# Patient Record
Sex: FEMALE | Race: WHITE
[De-identification: names, ages, dates, MRNs, and addresses within clinical notes are randomized per-mention and may not be internally consistent; named-entity substitution may affect disease eponyms.]

---

## 2019-07-04 ENCOUNTER — HOSPITAL ENCOUNTER (INPATIENT)
Dept: HOSPITAL 47 - EC | Age: 79
LOS: 2 days | Discharge: HOME | DRG: 418 | End: 2019-07-06
Payer: MEDICARE

## 2019-07-04 VITALS — BODY MASS INDEX: 23.3 KG/M2

## 2019-07-04 DIAGNOSIS — K85.10: Primary | ICD-10-CM

## 2019-07-04 DIAGNOSIS — R18.8: ICD-10-CM

## 2019-07-04 DIAGNOSIS — Z88.0: ICD-10-CM

## 2019-07-04 DIAGNOSIS — K80.43: ICD-10-CM

## 2019-07-04 DIAGNOSIS — K80.01: ICD-10-CM

## 2019-07-04 DIAGNOSIS — Z91.011: ICD-10-CM

## 2019-07-04 DIAGNOSIS — J45.909: ICD-10-CM

## 2019-07-04 DIAGNOSIS — K75.9: ICD-10-CM

## 2019-07-04 DIAGNOSIS — Z79.51: ICD-10-CM

## 2019-07-04 DIAGNOSIS — Z88.8: ICD-10-CM

## 2019-07-04 DIAGNOSIS — Z79.899: ICD-10-CM

## 2019-07-04 LAB
ALBUMIN SERPL-MCNC: 3.6 G/DL (ref 3.5–5)
ALP SERPL-CCNC: 74 U/L (ref 38–126)
ALT SERPL-CCNC: 372 U/L (ref 9–52)
ANION GAP SERPL CALC-SCNC: 6 MMOL/L
AST SERPL-CCNC: 423 U/L (ref 14–36)
BASOPHILS # BLD AUTO: 0 K/UL (ref 0–0.2)
BASOPHILS NFR BLD AUTO: 1 %
BUN SERPL-SCNC: 12 MG/DL (ref 7–17)
CALCIUM SPEC-MCNC: 8.6 MG/DL (ref 8.4–10.2)
CHLORIDE SERPL-SCNC: 108 MMOL/L (ref 98–107)
CO2 SERPL-SCNC: 27 MMOL/L (ref 22–30)
EOSINOPHIL # BLD AUTO: 0.1 K/UL (ref 0–0.7)
EOSINOPHIL NFR BLD AUTO: 1 %
ERYTHROCYTE [DISTWIDTH] IN BLOOD BY AUTOMATED COUNT: 4.42 M/UL (ref 3.8–5.4)
ERYTHROCYTE [DISTWIDTH] IN BLOOD: 13.7 % (ref 11.5–15.5)
GLUCOSE SERPL-MCNC: 88 MG/DL (ref 74–99)
HCT VFR BLD AUTO: 40.8 % (ref 34–46)
HGB BLD-MCNC: 13 GM/DL (ref 11.4–16)
INR PPP: 1 (ref ?–1.2)
LIPASE SERPL-CCNC: 1447 U/L (ref 23–300)
LYMPHOCYTES # SPEC AUTO: 0.8 K/UL (ref 1–4.8)
LYMPHOCYTES NFR SPEC AUTO: 12 %
MAGNESIUM SPEC-SCNC: 2.2 MG/DL (ref 1.6–2.3)
MCH RBC QN AUTO: 29.5 PG (ref 25–35)
MCHC RBC AUTO-ENTMCNC: 31.9 G/DL (ref 31–37)
MCV RBC AUTO: 92.4 FL (ref 80–100)
MONOCYTES # BLD AUTO: 0.5 K/UL (ref 0–1)
MONOCYTES NFR BLD AUTO: 7 %
NEUTROPHILS # BLD AUTO: 5.3 K/UL (ref 1.3–7.7)
NEUTROPHILS NFR BLD AUTO: 78 %
PLATELET # BLD AUTO: 223 K/UL (ref 150–450)
POTASSIUM SERPL-SCNC: 4 MMOL/L (ref 3.5–5.1)
PROT SERPL-MCNC: 6.1 G/DL (ref 6.3–8.2)
PT BLD: 10.5 SEC (ref 9–12)
SODIUM SERPL-SCNC: 141 MMOL/L (ref 137–145)
WBC # BLD AUTO: 6.9 K/UL (ref 3.8–10.6)

## 2019-07-04 PROCEDURE — 82150 ASSAY OF AMYLASE: CPT

## 2019-07-04 PROCEDURE — 85610 PROTHROMBIN TIME: CPT

## 2019-07-04 PROCEDURE — 99285 EMERGENCY DEPT VISIT HI MDM: CPT

## 2019-07-04 PROCEDURE — 94640 AIRWAY INHALATION TREATMENT: CPT

## 2019-07-04 PROCEDURE — 88304 TISSUE EXAM BY PATHOLOGIST: CPT

## 2019-07-04 PROCEDURE — 85025 COMPLETE CBC W/AUTO DIFF WBC: CPT

## 2019-07-04 PROCEDURE — 83690 ASSAY OF LIPASE: CPT

## 2019-07-04 PROCEDURE — 83735 ASSAY OF MAGNESIUM: CPT

## 2019-07-04 PROCEDURE — 80053 COMPREHEN METABOLIC PANEL: CPT

## 2019-07-04 RX ADMIN — CEFAZOLIN SCH MLS/HR: 330 INJECTION, POWDER, FOR SOLUTION INTRAMUSCULAR; INTRAVENOUS at 22:19

## 2019-07-04 NOTE — ED
General Adult HPI





- General


Stated complaint: Abd Pain


Time Seen by Provider: 07/04/19 21:31


Source: patient, family, EMS, RN notes reviewed, old records reviewed


Mode of arrival: EMS


Limitations: no limitations





- History of Present Illness


Initial comments: 





78-year-old female presenting as transfer from outside hospital with 

cholelithiasis and acute cholecystitis.  Patient reports 2 days of epigastric 

and right upper quadrant abdominal pain.  She had several episodes of vomiting. 

She does complain of subjective fever or chills.  She is otherwise healthy with 

past medical history of asthma for which she follows with pulmonology.  Denies 

chest pain or dyspnea.  No lower abdominal pain.  Patient was given antibiotics 

prior to transfer.  Transfer for surgical evaluation.





- Related Data


                                Home Medications











 Medication  Instructions  Recorded  Confirmed


 


Budesonide/Formoterol Fumarate 2 puff INHALATION RT-BID 07/04/19 07/04/19





[Symbicort 160-4.5 Mcg Inhaler]   


 


Calcium Carbonate [Calcium] 1,200 mg PO DAILY 07/04/19 07/04/19


 


Cetirizine HCl [Zyrtec] 10 mg PO HS 07/04/19 07/04/19


 


Montelukast [Singulair] 10 mg PO HS 07/04/19 07/04/19











                                    Allergies











Allergy/AdvReac Type Severity Reaction Status Date / Time


 


aspirin Allergy  PERALLERGIS Verified 07/04/19 21:53





   T  


 


Penicillins Allergy  Rash/Hives Verified 07/04/19 21:53


 


Milk Containing Products AdvReac  Nausea & Verified 07/04/19 21:53





   Vomiting  














Review of Systems


ROS Statement: 


Those systems with pertinent positive or pertinent negative responses have been 

documented in the HPI.





ROS Other: All systems not noted in ROS Statement are negative.





Past Medical History


Past Medical History: Asthma


Past Surgical History: No Surgical Hx Reported


Smoking Status: Never smoker


Past Alcohol Use History: None Reported


Past Drug Use History: None Reported





General Exam


Limitations: no limitations


General appearance: alert, in no apparent distress


Head exam: Present: atraumatic, normocephalic


Eye exam: Present: normal appearance, PERRL


ENT exam: Present: normal exam


Neck exam: Present: normal inspection.  Absent: tenderness, meningismus


Respiratory exam: Present: normal lung sounds bilaterally.  Absent: respiratory 

distress, wheezes


Cardiovascular Exam: Present: regular rate, normal rhythm


GI/Abdominal exam: Present: soft, tenderness (Right upper quadrant and 

epigastric tenderness to palpation).  Absent: distended, guarding, rebound


Extremities exam: Present: normal inspection, normal capillary refill.  Absent: 

pedal edema, calf tenderness


Neurological exam: Present: alert, oriented X3, CN II-XII intact.  Absent: motor

 sensory deficit


Psychiatric exam: Present: normal affect, normal mood


Skin exam: Present: warm, dry, intact.  Absent: cyanosis, diaphoretic





Course


                                   Vital Signs











  07/04/19





  21:31


 


Temperature 98.3 F


 


Pulse Rate 63


 


Respiratory 18





Rate 


 


Blood Pressure 154/78


 


O2 Sat by Pulse 98





Oximetry 














Medical Decision Making





- Medical Decision Making





78-year-old female transferred from outside facility with acute cholecystitis 

and cholelithiasis.  Laboratory studies reviewed, patient blood cell count 8.9, 

normal electrolytes, normal creatinine.  She had a transaminitis with an AST 

464, .  She'll total bili 2.6.  Lipase 625.  Patient is well-appearing 

with stable vitals.  She is nontoxic.  CT interpretation is reviewed, 

cholelithiasis with gallbladder wall thickening, pericholecystic fluid.  Minimal

 dilatation of the intrahepatic docs.  She was given clindamycin and Flagyl 

prior to transfer.  She will be continued on antibiotics and admitted to general

 surgery.  Case is discussed with Dr. Caceres, she will accept admission.








Laboratory studies will be repeated in the morning.





Disposition


Clinical Impression: 


 Acute cholecystitis





Disposition: ADMITTED AS IP TO THIS [facility]


Condition: Stable


Is patient prescribed a controlled substance at d/c from ED?: No


Referrals: 


None,Stated [Primary Care Provider] - 1-2 days


Decision to Admit Reason: Admit from EC


Decision Date: 07/04/19


Decision Time: 21:58

## 2019-07-05 VITALS — RESPIRATION RATE: 16 BRPM

## 2019-07-05 LAB
ALBUMIN SERPL-MCNC: 3.5 G/DL (ref 3.5–5)
ALP SERPL-CCNC: 66 U/L (ref 38–126)
ALT SERPL-CCNC: 299 U/L (ref 9–52)
AMYLASE SERPL-CCNC: 155 U/L (ref 30–110)
ANION GAP SERPL CALC-SCNC: 8 MMOL/L
AST SERPL-CCNC: 235 U/L (ref 14–36)
BUN SERPL-SCNC: 12 MG/DL (ref 7–17)
CALCIUM SPEC-MCNC: 8.2 MG/DL (ref 8.4–10.2)
CHLORIDE SERPL-SCNC: 110 MMOL/L (ref 98–107)
CO2 SERPL-SCNC: 24 MMOL/L (ref 22–30)
GLUCOSE SERPL-MCNC: 79 MG/DL (ref 74–99)
LIPASE SERPL-CCNC: 429 U/L (ref 23–300)
POTASSIUM SERPL-SCNC: 3.6 MMOL/L (ref 3.5–5.1)
PROT SERPL-MCNC: 5.8 G/DL (ref 6.3–8.2)
SODIUM SERPL-SCNC: 142 MMOL/L (ref 137–145)

## 2019-07-05 PROCEDURE — 0FT44ZZ RESECTION OF GALLBLADDER, PERCUTANEOUS ENDOSCOPIC APPROACH: ICD-10-PCS

## 2019-07-05 PROCEDURE — 8E0W4CZ ROBOTIC ASSISTED PROCEDURE OF TRUNK REGION, PERCUTANEOUS ENDOSCOPIC APPROACH: ICD-10-PCS

## 2019-07-05 PROCEDURE — 4A1BXSH MONITORING OF GASTROINTESTINAL VASCULAR PERFUSION USING INDOCYANINE GREEN DYE, EXTERNAL APPROACH: ICD-10-PCS

## 2019-07-05 RX ADMIN — HEPARIN SODIUM SCH UNIT: 5000 INJECTION, SOLUTION INTRAVENOUS; SUBCUTANEOUS at 21:18

## 2019-07-05 RX ADMIN — METRONIDAZOLE SCH MLS/HR: 500 INJECTION, SOLUTION INTRAVENOUS at 21:19

## 2019-07-05 RX ADMIN — METRONIDAZOLE SCH MLS/HR: 500 INJECTION, SOLUTION INTRAVENOUS at 04:56

## 2019-07-05 RX ADMIN — CEFAZOLIN SCH MLS: 330 INJECTION, POWDER, FOR SOLUTION INTRAMUSCULAR; INTRAVENOUS at 09:09

## 2019-07-05 RX ADMIN — CEFAZOLIN SCH MLS/HR: 330 INJECTION, POWDER, FOR SOLUTION INTRAMUSCULAR; INTRAVENOUS at 23:16

## 2019-07-05 RX ADMIN — BUDESONIDE AND FORMOTEROL FUMARATE DIHYDRATE SCH PUFF: 160; 4.5 AEROSOL RESPIRATORY (INHALATION) at 19:41

## 2019-07-05 RX ADMIN — CEFAZOLIN SCH MLS: 330 INJECTION, POWDER, FOR SOLUTION INTRAMUSCULAR; INTRAVENOUS at 07:27

## 2019-07-05 RX ADMIN — CEFAZOLIN SCH GM: 10 INJECTION, POWDER, FOR SOLUTION INTRAVENOUS at 17:52

## 2019-07-05 RX ADMIN — METRONIDAZOLE SCH MLS/HR: 500 INJECTION, SOLUTION INTRAVENOUS at 14:18

## 2019-07-05 NOTE — P.GSHP
History of Present Illness


H&P Date: 07/05/19











CHIEF COMPLAINT: Cholecystitis 





HISTORY OF PRESENT ILLNESS: The patient is a 78-year-old female who presents 

with history of epigastric including right upper quadrant abdominal pain x 3 

days. She reports eating pizza prior to her pain. She reports not seeing a 

medical provider in the last 3 years due to lost of provider serving her 

community. She denies any previous abdominal surgeries. She reports general good

health. She denies any chest pain, shortness of breath. She presents as a 

transfer from [facility] for pancreatitis, elevated liver enzymes and

acute cholecystitis with pericholecystic fluid. She is admitted for pancreatitis

with gallstones. 





PAST MEDICAL HISTORY: 


Please see list





PAST SURGICAL HISTORY: 


Please see list





MEDICATIONS: 


Please see list





ALLERGIES: Denies. 





SOCIAL HISTORY: No illicit drug use or recent tobacco use





FAMILY HISTORY: Noncontributory. 





REVIEW OF ORGAN SYSTEMS: 


CONSTITUTIONAL: No reports of fevers or chills. 


EYES: Denies any trouble with vision. No glasses.


HEENT:  No difficulties with hearing. No nosebleeds.  No difficulty swallowing. 


RESPIRATORY:  Denies pneumonia. Denies any troubles with breathing or dyspnea on

exertion. 


CARDIOVASCULAR:  Denies any chest pain, palpitations, or recent heart attacks. 


GASTROINTESTINAL: Has fatty food intolerance.  


GENITOURINARY:  Denies any blood in urine or increased urinary frequency. 


NEUROLOGICAL:  Denies any numbness or tingling along the distal extremities. No 

seizure disorders or headaches.


MUSCULOSKELETAL: Has occasional back pain, stiffness or joint arthritis. 


SKIN: No rash. No skin cancer.


PSYCHIATRIC:  Denies current depression or suicidal thoughts.


ENDOCRINE:  Denies current thyroid disorders. Denies any blood sugar glucose 

intolerance.


HEME/LYMPHATIC: Denies any lumps and bumps around the neck. No recent deep 

venous thrombosis.  


ALLERGY/IMMUNOLOGY:  No immunoglobulin therapy. No immune deficiencies.


BREAST: Denies current breast lumps, pain or nipple discharge.





PHYSICAL EXAM:


VITALS: Reviewed


CONSTITUTIONAL:  Well developed and in no acute distress. 


EYES:  Conjuctivae without sclera icterus. Pupils are equally round and reactive

to light. Extraocular movements grossly intact. 


HEAD, EARS, NOSE, THROAT: Moist buccal mucosa. Head is atraumatic, normocepha

lic. Hears conversational speech. No nasal drainage. Good dentition.


NECK:  Supple. No JV distention. No thyroidomegaly.


RESPIRATORY:  Non-labored respirations and equal bilateral excursions. No gross 

wheezes. 


CARDIOVASCULAR:  Regular rate and rhythm.  Extremities without moderate edema. 

Palpable 2+ radial pulses.


ABDOMEN:  No hepatomegaly.  Non-distended. Tender at epigastrium and right upper

quadrant.


LYMPH: No neck lymphadenopathy. 


MUSCULOSKELETAL: Gait within normal limits. Range of motion bilateral upper 

extremities within normal limits.  Nail and fingers with good capillary refill.


SKIN:  Warm and well perfused with good skin turgor.


NEUROLOGIC: Cranial nerves I through XII grossly intact. Sensation upper and 

extremities intact. No focal or lateralizing signs. 


PSYCH:  Appropriate affect.  Alert and oriented to person, place and time. 

Displays appropriate insight.





CLINCAL LABS: Reviewed LFTs moderately elevated, lipase elevated.








ASSESSMENT: 


1.  Epigastric and right upper quadrant abdominal pain


2.  Acute pancreatitis


3.  Symptomatic gallstones with acute cholecystits


4.  Elevated liver enzymes





PLAN: 


1.  Will need a robotic cholecystectomy possible open.  Benefits and risks were 

described. 


2.  Heparin for DVT prophylaxis 5000 units.


3.  Antibiotic prophylaxis.


4.  12-lead EKG for pre-op


5.  Inpatient hospitalization more than 2 nights for severity of pancreatitis 

acute with acute cholecystitis and hepatitis without jaundice.








Past Medical History


Past Medical History: Asthma


History of Any Multi-Drug Resistant Organisms: None Reported


Past Surgical History: No Surgical Hx Reported


Past Anesthesia/Blood Transfusion Reactions: No Reported Reaction


Past Psychological History: No Psychological Hx Reported


Smoking Status: Never smoker


Past Alcohol Use History: None Reported


Past Drug Use History: None Reported





Medications and Allergies


                                Home Medications











 Medication  Instructions  Recorded  Confirmed  Type


 


Budesonide/Formoterol Fumarate 2 puff INHALATION RT-BID 07/04/19 07/04/19 

History





[Symbicort 160-4.5 Mcg Inhaler]    


 


Calcium Carbonate [Calcium] 1,200 mg PO DAILY 07/04/19 07/04/19 History


 


Cetirizine HCl [Zyrtec] 10 mg PO HS 07/04/19 07/04/19 History


 


Montelukast [Singulair] 10 mg PO HS 07/04/19 07/04/19 History








                                    Allergies











Allergy/AdvReac Type Severity Reaction Status Date / Time


 


aspirin Allergy  PERALLERGIS Verified 07/04/19 21:53





   T  


 


Penicillins Allergy  Rash/Hives Verified 07/04/19 21:53


 


Milk Containing Products AdvReac  Nausea & Verified 07/04/19 21:53





   Vomiting  














Surgical - Exam


                                   Vital Signs











Temp Pulse Resp BP Pulse Ox


 


 98.3 F   63   18   154/78   98 


 


 07/04/19 21:31  07/04/19 21:31  07/04/19 21:31  07/04/19 21:31  07/04/19 21:31














Results





- Labs





                                 07/04/19 22:15





                                 07/04/19 22:15


                  Abnormal Lab Results - Last 24 Hours (Table)











  07/04/19 07/04/19 Range/Units





  22:15 22:15 


 


Lymphocytes #  0.8 L   (1.0-4.8)  k/uL


 


Chloride   108 H  ()  mmol/L


 


AST   423 H  (14-36)  U/L


 


ALT   372 H  (9-52)  U/L


 


Total Protein   6.1 L  (6.3-8.2)  g/dL


 


Lipase   1447 H  ()  U/L








                                 Diabetes panel











  07/04/19 Range/Units





  22:15 


 


Sodium  141  (137-145)  mmol/L


 


Potassium  4.0  (3.5-5.1)  mmol/L


 


Chloride  108 H  ()  mmol/L


 


Carbon Dioxide  27  (22-30)  mmol/L


 


BUN  12  (7-17)  mg/dL


 


Creatinine  0.79  (0.52-1.04)  mg/dL


 


Glucose  88  (74-99)  mg/dL


 


Calcium  8.6  (8.4-10.2)  mg/dL


 


AST  423 H  (14-36)  U/L


 


ALT  372 H  (9-52)  U/L


 


Alkaline Phosphatase  74  ()  U/L


 


Total Protein  6.1 L  (6.3-8.2)  g/dL


 


Albumin  3.6  (3.5-5.0)  g/dL








                                  Calcium panel











  07/04/19 Range/Units





  22:15 


 


Calcium  8.6  (8.4-10.2)  mg/dL


 


Albumin  3.6  (3.5-5.0)  g/dL








                                 Pituitary panel











  07/04/19 Range/Units





  22:15 


 


Sodium  141  (137-145)  mmol/L


 


Potassium  4.0  (3.5-5.1)  mmol/L


 


Chloride  108 H  ()  mmol/L


 


Carbon Dioxide  27  (22-30)  mmol/L


 


BUN  12  (7-17)  mg/dL


 


Creatinine  0.79  (0.52-1.04)  mg/dL


 


Glucose  88  (74-99)  mg/dL


 


Calcium  8.6  (8.4-10.2)  mg/dL








                                  Adrenal panel











  07/04/19 Range/Units





  22:15 


 


Sodium  141  (137-145)  mmol/L


 


Potassium  4.0  (3.5-5.1)  mmol/L


 


Chloride  108 H  ()  mmol/L


 


Carbon Dioxide  27  (22-30)  mmol/L


 


BUN  12  (7-17)  mg/dL


 


Creatinine  0.79  (0.52-1.04)  mg/dL


 


Glucose  88  (74-99)  mg/dL


 


Calcium  8.6  (8.4-10.2)  mg/dL


 


Total Bilirubin  1.0  (0.2-1.3)  mg/dL


 


AST  423 H  (14-36)  U/L


 


ALT  372 H  (9-52)  U/L


 


Alkaline Phosphatase  74  ()  U/L


 


Total Protein  6.1 L  (6.3-8.2)  g/dL


 


Albumin  3.6  (3.5-5.0)  g/dL














Assessment and Plan


(1) Acute pancreatitis


Current Visit: Yes   Status: Acute   Code(s): K85.90 - ACUTE PANCREATITIS 

WITHOUT NECROSIS OR INFECTION, UNSP   SNOMED Code(s): 940034353


   





(2) Transaminitis


Current Visit: Yes   Status: Acute   Code(s): R74.0 - NONSPEC ELEV OF LEVELS OF 

TRANSAMNS & LACTIC ACID DEHYDRGNSE   SNOMED Code(s): 216441030


   





(3) Acute cholecystitis


Current Visit: Yes   Status: Acute   Code(s): K81.0 - ACUTE CHOLECYSTITIS   

SNOMED Code(s): 81259680

## 2019-07-05 NOTE — P.OP
Date of Procedure: 07/05/19


Description of Procedure: 











SURGEON:  TINO HERNANDEZ MD





PREOPERATIVE DIAGNOSES:  


1.  Acute pancreatitis due to gallstones


2.  Symptomatic gallstones with acute cholecystitis due to cystic duct 

obstruction


3.  Right upper quadrant abdominal pain with epigastric abdominal pain


4.  Elevated liver enzymes, transaminitis


5.  Asthma


6.  Seasonal ALLERGIES





POSTOPERATIVE DIAGNOSES:  


1.  Acute pancreatitis due to gallstones


2.  Symptomatic gallstones with acute cholecystitis due to cystic duct 

obstruction


3.  Right upper quadrant abdominal pain with epigastric abdominal pain


4.  Elevated liver enzymes, transaminitis


5.  Asthma


6.  Seasonal ALLERGIES





OPERATION:       


Robotic-assisted da Lyubov Xi laparoscopic cholecystectomy, multiport with 

FIREFLY


 


ESTIMATED BLOOD LOSS:  5 mL.


SPECIMENS REMOVED:  Gallbladder.


COMPLICATIONS:  None.





OPERATIVE FINDINGS:  


1.  Moderate edema gallbladder wall with pericholecystic fluid consistent with 

acute cholecystitis


2.  Mild intra-abdominal ascites


3.  Minimal fatty liver disease without cirrhosis





INDICATIONS: The patient is a 78-year-old female who presents with gallstone-

induced pancreatitis with acute cholecystitis and elevated transaminases.  

Urgent surgical intervention with cholecystectomy was described at length 

including injury to the biliary


tree, bleeding, infection, need for further surgery. Informed consent was 

obtained.  Robotic 


assisted laparoscopic approach was described. Benefits and risks of the 

procedure including but not limited to bleeding, 


infection, injury to the biliary tree was described. Informed consent was 

obtained.  





DESCRIPTION OF PROCEDURE: Patient was brought to the operating room, 


placed in supine position. After general induction, the abdomen had 


been prepped and draped in standard sterile fashion. The robotic da Lyubov 


XI system was primed.  





After a timeout protocol was performed, the patient had been prepped 


and draped in standard sterile fashion.





The patient was injected with indocyanine green.





A 5 mm 0 degrees laparoscopic trocar entry was performed along the left upper 

quadrant.  The abdomen insufflated to 15 mmHg pressure which was tolerated well.

Diagnostic laparoscopy demonstrated no injury to bowel viscera or mesentery.  

The liver surface was unremarkable. Next, two 8 mm robotic ports were placed 

along the right upper abdomen. The camera 8-mm port was maintained along the 

epigastrium.  Another 8 mm port was placed along the left upper abdominal wall 

after exchanging the 5 mm port. Please note that the ports were placed at least 

10 to 15 cm away from the target anatomy of the gallbladder. 





The robot was docked along the left lateral abdomen. 


The patient was repositioned in reverse Trendelenburg position. 





Using a grasper for arm 3, a grasper for arm 4, including hook cautery for arm 

1, the 


robotic system was docked and primed as described.  


Instruments were interchanged by the assistant including hook cautery, Bovie 

cautery and clip appliers.





I had sat at the console. 





The gallbladder fundus was retracted over the dome of the liver.





Moderate edema of the gallbladder wall was identified.  The cystic duct was 

dilated.  The common bile duct was visualized using contrast.





Initial attention was brought to the infundibulum which was gently


retracted in the inferior lateral approach. Using a grasper, the cystic


duct including the cystic artery was carefully skeletonized.


FIREFLY was used to identify the cystic artery and cystic structures.


Large PLASTIC clips were used throughout the entire case.


Using a clip applier 2 clips were placed proximally, and divided proximally 

using vessel sealer. Again care was taken to


avoid any injury to the biliary tree as the common bile duct was clearly


visualized during this portion of dissection. Next, the cystic artery


was similarly clipped and cauterized.





Electro-Bovie cautery was used to remove the gallbladder from the


hepatic fossa. Hemostasis was checked and found to be adequate. 





The robot was undocked.





I re-scrubbed into the case.





Using a 10 mm Endo Catch bag via the left upper quadrant incision, the 


specimen was removed from the abdominal cavity. 





For the size of the gallbladder, the incision of the left upper quadrant was 

widened.  Fascia was closed using 0 Vicryl and Obed Michel.





All pneumoperitoneum instruments were evacuated from the abdominal 


cavity. The incisions were reapproximated using 4-0 Monocryl in an interrupted 


subcuticular fashion.  





Please note along the trocar sites, local anesthetic was placed as a field block




prior to insertion of all instruments.  





Liquid glue was applied to the skin.





At the end of the procedure needle, sponge, and instrument count had 


been verified correct by the surgical technician. The patient was 


transferred to postanesthesia care unit in stable condition.





I personally contacted the patient's daughter over the phone regarding 

intraoperative findings and overall condition is good.  Her family was pleased 

with the level of care.

## 2019-07-06 VITALS — DIASTOLIC BLOOD PRESSURE: 54 MMHG | SYSTOLIC BLOOD PRESSURE: 100 MMHG | TEMPERATURE: 98 F | HEART RATE: 68 BPM

## 2019-07-06 LAB
ALBUMIN SERPL-MCNC: 3 G/DL (ref 3.5–5)
ALP SERPL-CCNC: 53 U/L (ref 38–126)
ALT SERPL-CCNC: 150 U/L (ref 9–52)
ANION GAP SERPL CALC-SCNC: 10 MMOL/L
AST SERPL-CCNC: 94 U/L (ref 14–36)
BUN SERPL-SCNC: 11 MG/DL (ref 7–17)
CALCIUM SPEC-MCNC: 7.3 MG/DL (ref 8.4–10.2)
CHLORIDE SERPL-SCNC: 108 MMOL/L (ref 98–107)
CO2 SERPL-SCNC: 22 MMOL/L (ref 22–30)
GLUCOSE SERPL-MCNC: 75 MG/DL (ref 74–99)
LIPASE SERPL-CCNC: 68 U/L (ref 23–300)
POTASSIUM SERPL-SCNC: 3.9 MMOL/L (ref 3.5–5.1)
PROT SERPL-MCNC: 5.2 G/DL (ref 6.3–8.2)
SODIUM SERPL-SCNC: 140 MMOL/L (ref 137–145)

## 2019-07-06 RX ADMIN — CEFAZOLIN SCH GM: 10 INJECTION, POWDER, FOR SOLUTION INTRAVENOUS at 10:17

## 2019-07-06 RX ADMIN — HEPARIN SODIUM SCH UNIT: 5000 INJECTION, SOLUTION INTRAVENOUS; SUBCUTANEOUS at 08:46

## 2019-07-06 RX ADMIN — BUDESONIDE AND FORMOTEROL FUMARATE DIHYDRATE SCH PUFF: 160; 4.5 AEROSOL RESPIRATORY (INHALATION) at 08:44

## 2019-07-06 RX ADMIN — CEFAZOLIN SCH GM: 10 INJECTION, POWDER, FOR SOLUTION INTRAVENOUS at 01:40

## 2019-07-06 RX ADMIN — CEFAZOLIN SCH MLS/HR: 330 INJECTION, POWDER, FOR SOLUTION INTRAMUSCULAR; INTRAVENOUS at 05:47

## 2019-07-06 RX ADMIN — METRONIDAZOLE SCH MLS/HR: 500 INJECTION, SOLUTION INTRAVENOUS at 05:46
